# Patient Record
Sex: MALE | Race: WHITE | NOT HISPANIC OR LATINO | Employment: OTHER | URBAN - METROPOLITAN AREA
[De-identification: names, ages, dates, MRNs, and addresses within clinical notes are randomized per-mention and may not be internally consistent; named-entity substitution may affect disease eponyms.]

---

## 2017-01-05 NOTE — PATIENT DISCUSSION
1.  Post-Op Cataract Surgery 15-90 days Left Eye (OS)-  Doing well with stable vision. 2. Return for an appointment in 8 months for comprehensive exam. with Dr. Nani Benson. 3.  Hold on cataract sx OD per patient.

## 2017-08-29 NOTE — PATIENT DISCUSSION
1.  Pseudophakia OS - IOL stable. Monitor. 2. Combined Types of Cataract OD: Explained how cataracts can effect vision. Recommend clinical observation. The patient was advised to contact us if any change or worsening of vision. 3. ARMD OD dry - Importance of smoking cessation blood pressure control and healthy diet were emphasized. In accordance with the AREDS study a good multivitamin containing EC and Zinc were recommened to be taken daily. Patient was instructed to self monitor their monocular vision (reading/[employer] Grid) at least weekly. Patient should immediately report any new onset of decreased vision or metamorphopsia. 4. Exudative Macular Degeneration OS - Consult Dr. Giulia Saavedra. Return for an appointment in 2 months for office call and Reraction. with Dr. Juan Ramon Lemons.

## 2017-11-17 NOTE — PATIENT DISCUSSION
1. Combined Types of Cataract OD: Hold on consult. Recheck in 6 months. If retina stable to consider cataract consult. Did much appreciate significant improvement OS but macula OS is more compromised. 2. Pseudophakia OS - IOL stable. Monitor. 3. Return for an appointment for comprehensive exam. OCT Macula. with Dr. Ehsan Car. 4.  Glasses change optional. Hold until recheck in 6 months. 5.   Refractive error

## 2020-02-05 ENCOUNTER — CATARACT CONSULT (OUTPATIENT)
Dept: URBAN - METROPOLITAN AREA CLINIC 39 | Facility: CLINIC | Age: 80
End: 2020-02-05

## 2020-02-05 DIAGNOSIS — Z96.1: ICD-10-CM

## 2020-02-05 DIAGNOSIS — E11.9: ICD-10-CM

## 2020-02-05 DIAGNOSIS — H35.3131: ICD-10-CM

## 2020-02-05 DIAGNOSIS — H25.811: ICD-10-CM

## 2020-02-05 DIAGNOSIS — H43.813: ICD-10-CM

## 2020-02-05 PROCEDURE — 92136TC INTERFEROMETRY - TECHNICAL COMPONENT

## 2020-02-05 PROCEDURE — 99204 OFFICE O/P NEW MOD 45 MIN: CPT

## 2020-02-05 PROCEDURE — 92025-3 CORNEAL TOPO, REFUSED

## 2020-02-05 PROCEDURE — 92134 CPTRZ OPH DX IMG PST SGM RTA: CPT

## 2020-02-05 RX ORDER — KETOROLAC TROMETHAMINE 5 MG/ML: 1 SOLUTION OPHTHALMIC

## 2020-02-05 RX ORDER — MOXIFLOXACIN HYDROCHLORIDE 5 MG/ML: 1 SOLUTION/ DROPS OPHTHALMIC

## 2020-02-05 ASSESSMENT — VISUAL ACUITY
OD_SC: J2
OS_SC: J12
OD_BAT: 20/100
OS_SC: 20/25-1
OD_SC: CF 4FT

## 2020-02-05 ASSESSMENT — TONOMETRY
OS_IOP_MMHG: 21
OD_IOP_MMHG: 21

## 2020-02-26 ENCOUNTER — ESTABLISHED PATIENT (OUTPATIENT)
Dept: URBAN - METROPOLITAN AREA SURGERY 14 | Facility: SURGERY | Age: 80
End: 2020-02-26

## 2020-02-26 ENCOUNTER — SURGERY/PROCEDURE (OUTPATIENT)
Dept: URBAN - METROPOLITAN AREA CLINIC 39 | Facility: CLINIC | Age: 80
End: 2020-02-26

## 2020-02-26 DIAGNOSIS — H43.813: ICD-10-CM

## 2020-02-26 DIAGNOSIS — H57.03: ICD-10-CM

## 2020-02-26 DIAGNOSIS — H35.3131: ICD-10-CM

## 2020-02-26 DIAGNOSIS — H25.811: ICD-10-CM

## 2020-02-26 DIAGNOSIS — Z96.1: ICD-10-CM

## 2020-02-26 DIAGNOSIS — E11.9: ICD-10-CM

## 2020-02-26 PROCEDURE — 99211HP H&P OFFICE/OUTPATIENT VISIT, EST

## 2020-02-26 PROCEDURE — 66982 XCAPSL CTRC RMVL CPLX WO ECP: CPT

## 2020-02-27 ENCOUNTER — CATARACT POST-OP 1-DAY (OUTPATIENT)
Dept: URBAN - METROPOLITAN AREA CLINIC 39 | Facility: CLINIC | Age: 80
End: 2020-02-27

## 2020-02-27 DIAGNOSIS — Z96.1: ICD-10-CM

## 2020-02-27 PROCEDURE — 99024 POSTOP FOLLOW-UP VISIT: CPT

## 2020-02-27 ASSESSMENT — TONOMETRY
OS_IOP_MMHG: 18
OD_IOP_MMHG: 21

## 2020-02-27 ASSESSMENT — VISUAL ACUITY
OD_SC: 20/60-1
OD_PH: 20/50-2

## 2020-03-18 ENCOUNTER — POST-OP CATARACT (OUTPATIENT)
Dept: URBAN - METROPOLITAN AREA CLINIC 39 | Facility: CLINIC | Age: 80
End: 2020-03-18

## 2020-03-18 DIAGNOSIS — Z96.1: ICD-10-CM

## 2020-03-18 PROCEDURE — 99024 POSTOP FOLLOW-UP VISIT: CPT

## 2020-03-18 ASSESSMENT — VISUAL ACUITY
OS_SC: 20/25+1
OD_SC: J6
OU_SC: 20/25+1
OD_SC: 20/30+1
OS_SC: J8

## 2020-03-18 ASSESSMENT — TONOMETRY
OD_IOP_MMHG: 20
OS_IOP_MMHG: 18